# Patient Record
(demographics unavailable — no encounter records)

---

## 2024-10-30 NOTE — PHYSICAL EXAM
[Acute Distress] : no acute distress [Alert] : alert [Pink Nasal Mucosa] : pink nasal mucosa [Erythematous Oropharynx] : nonerythematous oropharynx [Supple] : supple [Regular Rate and Rhythm] : regular rate and rhythm [Normal S1, S2 audible] : normal S1, S2 audible [Murmur] : no murmur [NL] : warm, clear [FreeTextEntry7] : fine crackles b/l

## 2024-10-30 NOTE — HISTORY OF PRESENT ILLNESS
[de-identified] : Persistent cough for a few weeks [FreeTextEntry6] : HISTORY OF PRESENT ILLNESS - Barry has been experiencing a persistent cough for a few weeks, which has worsened in the past week and a half. - A strep test and a COVID test were performed on [facility] almost a week ago, both of which came back negative. - Barry has been experiencing nasal congestion, particularly at night, but can breathe easily through his nose during the day. - Barry reported that his throat hurts when he coughs. - Barry has not had any fevers, but has been warmer than usual. - Barry has been taking Mucinex and using a nasal spray for his symptoms.  FAMILY HISTORY Barry's mother had a cough for two months, which was diagnosed as bronchitis and resolved after antibiotic treatment.

## 2024-10-30 NOTE — HISTORY OF PRESENT ILLNESS
[de-identified] : Persistent cough for a few weeks [FreeTextEntry6] : HISTORY OF PRESENT ILLNESS - Barry has been experiencing a persistent cough for a few weeks, which has worsened in the past week and a half. - A strep test and a COVID test were performed on [facility] almost a week ago, both of which came back negative. - Barry has been experiencing nasal congestion, particularly at night, but can breathe easily through his nose during the day. - Barry reported that his throat hurts when he coughs. - Barry has not had any fevers, but has been warmer than usual. - Barry has been taking Mucinex and using a nasal spray for his symptoms.  FAMILY HISTORY Barry's mother had a cough for two months, which was diagnosed as bronchitis and resolved after antibiotic treatment.

## 2024-10-30 NOTE — DISCUSSION/SUMMARY
[FreeTextEntry1] : ASSESSMENT Suspected atypical pneumonia based on the persistent cough, intermittent pulmonary crackles, and the prevalence of atypical pneumonia in the community.  PLAN - Prescribe antibiotics for Barry to treat the suspected atypical pneumonia. - If Barry continues to exhibit significant cough after the five-day course of antibiotics, he should return for a follow-up evaluation. - Barry can continue to take Mucinex at night if it helps with his symptoms. - Azithromycin (Z-Xiang): 6.5 mLs today, then 3.5 mLs daily for four more days. - Children's Flonase: One spray each night for one week.

## 2025-01-08 NOTE — REVIEW OF SYSTEMS
[Nasal Discharge] : nasal discharge [Nasal Congestion] : nasal congestion [Cough] : cough [Myalgia] : myalgia [Negative] : Genitourinary

## 2025-01-10 NOTE — PHYSICAL EXAM
[Alert] : alert [Pink Nasal Mucosa] : pink nasal mucosa [Supple] : supple [Clear to Auscultation Bilaterally] : clear to auscultation bilaterally [Regular Rate and Rhythm] : regular rate and rhythm [Normal S1, S2 audible] : normal S1, S2 audible [Soft] : soft [Normal Bowel Sounds] : normal bowel sounds [Moves All Extremities x 4] : moves all extremities x4 [Normotonic] : normotonic [NL] : warm, clear [Acute Distress] : no acute distress [Conjuctival Injection] : no conjunctival injection [Erythematous Oropharynx] : nonerythematous oropharynx [Murmur] : no murmur [Tender] : nontender [Distended] : nondistended [Hepatosplenomegaly] : no hepatosplenomegaly [TextEntry] : - Physical Examination (PE) : General Alert and cooperative Respiratory: Lungs clear on auscultation bilaterally Cardiovascular: Not specifically mentioned Abdomen: No pain reported Skin: Some dryness noted Neurological: No focal deficits noted

## 2025-01-10 NOTE — HISTORY OF PRESENT ILLNESS
[de-identified] : - Chief Complaint (CC) : Persistent cough for approximately 5 days, associated with one episode of fever and general discomfort. [FreeTextEntry6] : Subjective: - Summary : 8-year-old male patient presenting with persistent cough, one episode of fever, and general discomfort. Previously seen at [facility] and started on amoxicillin without strep testing.  - History of Present Illness : Barry a 7-year-old male, presents with his mother for evaluation of a persistent cough that started last weekend. He was seen at [facility] on Sunday where he was prescribed amoxicillin without a strep test being performed due to the patient's fear. The patient experienced one episode of fever, which has since resolved. He has been feeling warm but hasn't reached fever temperatures since then. The cough has been severe enough to wake him at night. The patient reported feeling 'pins and needles' sensations in his arms and legs at night, described as muscle aches. He has also complained of pain inside his nostrils, but not from excessive nose blowing. The patient has been using saline nasal spray. He has missed school due to his symptoms, only returning yesterday but needing to be picked up early. The mother reports that the patient's lungs sounded 'bad' according to her  when listened to at urgent care, though this was not specified further. The patient has a history of walking pneumonia, but that had resolved prior to this illness.  - Review of Systems : - General : One episode of fever, feeling warm but not febrile since then, fatigue - Neurological : Reported 'pins and needles' sensation in arms and legs - Musculoskeletal : Muscle aches reported - Respiratory : Persistent cough, waking patient at night - Gastrointestinal : No gastrointestinal symptoms reported - Genitourinary : No genitourinary symptoms reported - Integumentary : No skin issues reported - Psychiatric : No psychiatric symptoms reported - Medications : - Amoxicillin 6.5 mL twice daily (started recently)  - Tylenol as needed for fever  Objective: - Diagnostic Results : COVID-19 test Negative (performed at [facility]) Flu and RSV tests: Pending (performed during current visit)

## 2025-01-10 NOTE — HISTORY OF PRESENT ILLNESS
[de-identified] : - Chief Complaint (CC) : Persistent cough for approximately 5 days, associated with one episode of fever and general discomfort. [FreeTextEntry6] : Subjective: - Summary : 8-year-old male patient presenting with persistent cough, one episode of fever, and general discomfort. Previously seen at [facility] and started on amoxicillin without strep testing.  - History of Present Illness : Barry a 7-year-old male, presents with his mother for evaluation of a persistent cough that started last weekend. He was seen at [facility] on Sunday where he was prescribed amoxicillin without a strep test being performed due to the patient's fear. The patient experienced one episode of fever, which has since resolved. He has been feeling warm but hasn't reached fever temperatures since then. The cough has been severe enough to wake him at night. The patient reported feeling 'pins and needles' sensations in his arms and legs at night, described as muscle aches. He has also complained of pain inside his nostrils, but not from excessive nose blowing. The patient has been using saline nasal spray. He has missed school due to his symptoms, only returning yesterday but needing to be picked up early. The mother reports that the patient's lungs sounded 'bad' according to her  when listened to at urgent care, though this was not specified further. The patient has a history of walking pneumonia, but that had resolved prior to this illness.  - Review of Systems : - General : One episode of fever, feeling warm but not febrile since then, fatigue - Neurological : Reported 'pins and needles' sensation in arms and legs - Musculoskeletal : Muscle aches reported - Respiratory : Persistent cough, waking patient at night - Gastrointestinal : No gastrointestinal symptoms reported - Genitourinary : No genitourinary symptoms reported - Integumentary : No skin issues reported - Psychiatric : No psychiatric symptoms reported - Medications : - Amoxicillin 6.5 mL twice daily (started recently)  - Tylenol as needed for fever  Objective: - Diagnostic Results : COVID-19 test Negative (performed at [facility]) Flu and RSV tests: Pending (performed during current visit)

## 2025-01-10 NOTE — DISCUSSION/SUMMARY
[FreeTextEntry1] : Assessment and Plan: - Upper Respiratory Infection : Patient presents with symptoms consistent with an upper respiratory infection, possibly viral in nature (flu or RSV). Strep throat is less likely given the absence of significant sore throat and with the presence of cough.  - FLU B POSITIVE - RSV Negative - Stop amoxicillin as it is not treating anything - Recommend over-the-counter cough medicine (Children's Robitussin) - Continue saline nasal spray - Consider adding Flonase nasal spray (one spray in each nostril at bedtime) - Encourage rest, hydration, and use of a humidifier - Monitor for worsening symptoms or return of fever - Follow up if symptoms persist or worsen

## 2025-03-17 NOTE — PLAN
[TextEntry] : Supportive care, fluids. Mucinex expectorant +/- nasal decongestant Flonase twice daily x 4-5 days, ten just at bedtime x 10 days Follow up if no better in 2-3 days or if worsens.

## 2025-03-17 NOTE — HISTORY OF PRESENT ILLNESS
[FreeTextEntry6] : Cough x 1week and stuffy nose x several days.  No fever Appetite and activity level normal.  Sleeping ok Given Benadryl with not much relief

## 2025-03-26 NOTE — HISTORY OF PRESENT ILLNESS
[de-identified] : Continued cough x 2.5 weeks [FreeTextEntry6] : Barry came in last week for URI symptoms  He woke up this morning with sore throat and has had ongoing congestion.  He has 2.5 weeks of daily coughing.  Denies fever.  Last night he woke up at midnight felt like throwing up from cough and throat was itching.  Denies emesis. No diarrhea currently, but initially had diarrhea when illness first started.  No rash.   He has also been experiencing itchy thumbs on/off since Feb  He has a cut under his R earlobe that's been lasting for a few weeks, applying antibacterial ointment but not getting much better.

## 2025-03-26 NOTE — HISTORY OF PRESENT ILLNESS
[de-identified] : Continued cough x 2.5 weeks [FreeTextEntry6] : Barry came in last week for URI symptoms  He woke up this morning with sore throat and has had ongoing congestion.  He has 2.5 weeks of daily coughing.  Denies fever.  Last night he woke up at midnight felt like throwing up from cough and throat was itching.  Denies emesis. No diarrhea currently, but initially had diarrhea when illness first started.  No rash.   He has also been experiencing itchy thumbs on/off since Feb  He has a cut under his R earlobe that's been lasting for a few weeks, applying antibacterial ointment but not getting much better.

## 2025-03-26 NOTE — PHYSICAL EXAM
[Acute Distress] : no acute distress [Alert] : alert [NL] : left tympanic membrane clear, right tympanic membrane clear [Erythematous Oropharynx] : erythematous oropharynx [Clear to Auscultation Bilaterally] : clear to auscultation bilaterally [Regular Rate and Rhythm] : regular rate and rhythm [Normal S1, S2 audible] : normal S1, S2 audible [Murmur] : no murmur [Moves All Extremities x 4] : moves all extremities x4 [FreeTextEntry7] : mostly clear, slight crackle b/l [de-identified] : cracked dry skin under R earlobe, with honey colored crust Hatchet Flap Text: The defect edges were debeveled with a #15 scalpel blade.  Given the location of the defect, shape of the defect and the proximity to free margins a hatchet flap was deemed most appropriate.  Using a sterile surgical marker, an appropriate hatchet flap was drawn incorporating the defect and placing the expected incisions within the relaxed skin tension lines where possible.    The area thus outlined was incised deep to adipose tissue with a #15 scalpel blade.  The skin margins were undermined to an appropriate distance in all directions utilizing iris scissors.

## 2025-03-26 NOTE — DISCUSSION/SUMMARY
[FreeTextEntry1] : POC strep negative Start azithromycin 10mg/kg x 1 day then 5mg/kg x 4 day for CAP/subacute cough Start mupirocin tid x 1 week to R earlobe for impetigo

## 2025-03-26 NOTE — PHYSICAL EXAM
[Acute Distress] : no acute distress [Alert] : alert [NL] : left tympanic membrane clear, right tympanic membrane clear [Erythematous Oropharynx] : erythematous oropharynx [Clear to Auscultation Bilaterally] : clear to auscultation bilaterally [Regular Rate and Rhythm] : regular rate and rhythm [Normal S1, S2 audible] : normal S1, S2 audible [Murmur] : no murmur [Moves All Extremities x 4] : moves all extremities x4 [FreeTextEntry7] : mostly clear, slight crackle b/l [de-identified] : cracked dry skin under R earlobe, with honey colored crust

## 2025-06-13 NOTE — HISTORY OF PRESENT ILLNESS
[Fruit] : fruit [Vegetables] : vegetables [Meat] : meat [Grains] : grains [Eggs] : eggs [Fish] : fish [Dairy] : dairy [Vitamins] : takes vitamins  [Eats healthy meals and snacks] : eats healthy meals and snacks [Eats meals with family] : eats meals with family [___ stools per day] : [unfilled]  stools per day [___ voids per day] : [unfilled] voids per day [Normal] : Normal [In own bed] : In own bed [Brushing teeth twice/d] : brushing teeth twice per day [Yes] : Patient goes to dentist yearly [Tap water] : Primary Fluoride Source: Tap water [Playtime (60 min/d)] : playtime 60 min a day [Participates in after-school activities] : participates in after-school activities [< 2 hrs of screen time per day] : less than 2 hrs of screen time per day [Appropiate parent-child-sibling interaction] : appropriate parent-child-sibling interaction [Has Friends] : has friends [Grade ___] : Grade [unfilled] [Adequate social interactions] : adequate social interactions [Adequate behavior] : adequate behavior [Adequate performance] : adequate performance [Adequate attention] : adequate attention [No difficulties with Homework] : no difficulties with homework [No] : No cigarette smoke exposure [Appropriately restrained in motor vehicle] : appropriately restrained in motor vehicle [Supervised outdoor play] : supervised outdoor play [Supervised around water] : supervised around water [Wears helmet and pads] : wears helmet and pads [Parent knows child's friends] : parent knows child's friends [Parent discusses safety rules regarding adults] : parent does not discuss safety rules regarding adults [Monitored computer use] : monitored computer use [Up to date] : Up to date [FreeTextEntry3] : 8p - 6a [FreeTextEntry9] : weekend screen time.  [FreeTextEntry1] : Barry is a 9 yo M who presents for wce.   Vegetarian.   Multivit flinestones gummy  School: [employer]; 3rd grade Summer: Going to [place] to see Grandpa and cousins.  Camp  B: Oatmeal w/ banana, strawberries, almonds.  L: watermelon, [place]. Hummus, Salads D: Canned tuna (not often), eggs, pasta, tofu, vegetable, spinach, peas, carrots. Veggie burger. French fries. Calcium: Yogurt, Cheese. Smoothies (w/ milk)  Lives with parents, pet fish.   cut on corona of penis, slighlty red and irritated

## 2025-06-13 NOTE — HISTORY OF PRESENT ILLNESS
[Fruit] : fruit [Vegetables] : vegetables [Meat] : meat [Grains] : grains [Eggs] : eggs [Fish] : fish [Dairy] : dairy [Vitamins] : takes vitamins  [Eats healthy meals and snacks] : eats healthy meals and snacks [Eats meals with family] : eats meals with family [___ stools per day] : [unfilled]  stools per day [___ voids per day] : [unfilled] voids per day [Normal] : Normal [In own bed] : In own bed [Brushing teeth twice/d] : brushing teeth twice per day [Yes] : Patient goes to dentist yearly [Tap water] : Primary Fluoride Source: Tap water [Playtime (60 min/d)] : playtime 60 min a day [Participates in after-school activities] : participates in after-school activities [< 2 hrs of screen time per day] : less than 2 hrs of screen time per day [Appropiate parent-child-sibling interaction] : appropriate parent-child-sibling interaction [Has Friends] : has friends [Grade ___] : Grade [unfilled] [Adequate social interactions] : adequate social interactions [Adequate behavior] : adequate behavior [Adequate performance] : adequate performance [Adequate attention] : adequate attention [No difficulties with Homework] : no difficulties with homework [No] : No cigarette smoke exposure [Appropriately restrained in motor vehicle] : appropriately restrained in motor vehicle [Supervised outdoor play] : supervised outdoor play [Supervised around water] : supervised around water [Wears helmet and pads] : wears helmet and pads [Parent knows child's friends] : parent knows child's friends [Parent discusses safety rules regarding adults] : parent does not discuss safety rules regarding adults [Monitored computer use] : monitored computer use [Up to date] : Up to date [FreeTextEntry3] : 8p - 6a [FreeTextEntry9] : weekend screen time.  [FreeTextEntry1] : Barry is a 9 yo M who presents for wce.   Vegetarian.   Multivit flinestones gummy  School: [employer]; 3rd grade Summer: Going to [place] to see Grandpa and cousins.  Camp  B: Oatmeal w/ banana, strawberries, almonds.  L: watermelon, [place]. Hummus, Salads D: Canned tuna (not often), eggs, pasta, tofu, vegetable, spinach, peas, carrots. Veggie burger. French fries. Calcium: Yogurt, Cheese. Smoothies (w/ milk)  Lives with parents, pet fish.   cut on corona of penis, slighlty red and irritated

## 2025-06-30 NOTE — HISTORY OF PRESENT ILLNESS
[de-identified] : Rash [FreeTextEntry6] : Barry presents with 36 hours of bright red cheeks. He developed diarrhea yesterday and had a little diarrhea over the weekend. His mother also had a 'stomach bug' this week. Denies cough, congestion, runny nose or fever.  Denies throat pain.

## 2025-06-30 NOTE — PHYSICAL EXAM
[Acute Distress] : no acute distress [Alert] : alert [Conjuctival Injection] : no conjunctival injection [NL] : left tympanic membrane clear, right tympanic membrane clear [Pink Nasal Mucosa] : pink nasal mucosa [Erythematous Oropharynx] : nonerythematous oropharynx [Supple] : supple [Clear to Auscultation Bilaterally] : clear to auscultation bilaterally [Regular Rate and Rhythm] : regular rate and rhythm [Normal S1, S2 audible] : normal S1, S2 audible [Murmur] : no murmur [Soft] : soft [Tender] : nontender [Distended] : nondistended [Normal Bowel Sounds] : normal bowel sounds [Hepatosplenomegaly] : no hepatosplenomegaly [Moves All Extremities x 4] : moves all extremities x4 [Normotonic] : normotonic [de-identified] : bright red flushed cheeks; lacy appearance. [place] erythematous macular eruption on upper arms b/l

## 2025-06-30 NOTE — DISCUSSION/SUMMARY
[FreeTextEntry1] : #5th disease - due to parvovirus - provided mother with anticipatory guidance from pediatric parent education handout - rash will resolve within 2-3 days - not contagious at this point - no dairy for 2-3 days given stomach upset and diarrhea; keep bland diet.